# Patient Record
Sex: MALE | Race: WHITE | NOT HISPANIC OR LATINO | URBAN - METROPOLITAN AREA
[De-identification: names, ages, dates, MRNs, and addresses within clinical notes are randomized per-mention and may not be internally consistent; named-entity substitution may affect disease eponyms.]

---

## 2020-11-30 ENCOUNTER — EMERGENCY (EMERGENCY)
Facility: HOSPITAL | Age: 45
LOS: 1 days | Discharge: HOME | End: 2020-11-30
Attending: EMERGENCY MEDICINE | Admitting: EMERGENCY MEDICINE
Payer: MEDICAID

## 2020-11-30 VITALS
RESPIRATION RATE: 17 BRPM | HEART RATE: 59 BPM | OXYGEN SATURATION: 96 % | TEMPERATURE: 99 F | SYSTOLIC BLOOD PRESSURE: 147 MMHG | DIASTOLIC BLOOD PRESSURE: 73 MMHG

## 2020-11-30 VITALS
TEMPERATURE: 98 F | RESPIRATION RATE: 17 BRPM | DIASTOLIC BLOOD PRESSURE: 61 MMHG | HEART RATE: 59 BPM | OXYGEN SATURATION: 96 % | SYSTOLIC BLOOD PRESSURE: 136 MMHG

## 2020-11-30 DIAGNOSIS — R41.82 ALTERED MENTAL STATUS, UNSPECIFIED: ICD-10-CM

## 2020-11-30 DIAGNOSIS — F12.10 CANNABIS ABUSE, UNCOMPLICATED: ICD-10-CM

## 2020-11-30 LAB — GLUCOSE BLDC GLUCOMTR-MCNC: 99 MG/DL — SIGNIFICANT CHANGE UP (ref 70–99)

## 2020-11-30 PROCEDURE — 99284 EMERGENCY DEPT VISIT MOD MDM: CPT

## 2020-11-30 NOTE — ED ADULT NURSE NOTE - CHIEF COMPLAINT QUOTE
patient found outside [facility], admits to taking k2. ambualtes with steady gait.
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
## 2020-11-30 NOTE — ED PROVIDER NOTE - PROGRESS NOTE DETAILS
Now AAO x 3.  Endorses K2 use.  NO acute complaints at this time.  Requesting food prior to discharge.

## 2020-11-30 NOTE — ED PROVIDER NOTE - PATIENT PORTAL LINK FT
You can access the FollowMyHealth Patient Portal offered by [facility] by registering at the following website: [email or web address]. By joining [employer]’s FollowMyHealth portal, you will also be able to view your health information using other applications (apps) compatible with our system.

## 2020-11-30 NOTE — ED ADULT TRIAGE NOTE - CHIEF COMPLAINT QUOTE
patient found outside [facility], admits to taking k2. ambualtes with steady gait.

## 2020-11-30 NOTE — ED PROVIDER NOTE - PHYSICAL EXAMINATION
General: lethargic, arousable to touch  Head: NCAT  Eyes: PERRL  Heart: RRR  Lungs: CTAB  Abd: soft, NTND  Neuro: moves all 4 extremities equally  Skin: no e/o lacerations, abrasions, or ecchymoses General: lethargic, arousable to verbal   Head: NCAT  Eyes: PERRL  Heart: RRR  Lungs: CTAB  Abd: soft, NTND  Neuro: moves all 4 extremities equally  Skin: no e/o lacerations, abrasions, or ecchymoses

## 2020-11-30 NOTE — ED PROVIDER NOTE - CLINICAL SUMMARY MEDICAL DECISION MAKING FREE TEXT BOX
Patient presenting after K2 use/AMS. No trauma or injuries noted. History and ROS limited due to altered state.  No evidence of head or extremity trauma. Will observe for clinical sobriety. Patient presenting after K2 use/AMS. No trauma or injuries noted. Last ED visit 2015 for an unrelated complaint.  History and ROS limited due to altered state.  No evidence of head or extremity trauma. Will observe for clinical sobriety.

## 2020-11-30 NOTE — ED ADULT NURSE REASSESSMENT NOTE - NS ED NURSE REASSESS COMMENT FT1
Pt. tolerating PO without any GI discomfort and ambulating with a steady gait --> d/c
patient explained fall risk during triage. was told plan of care involves walking trial.
Pt. is AAOx2 - self, place. Pt. reports "feeling better than when arrived". Pt. has non labored breathing, speaking in full sentences with no use of accessory muscle, nasal flaring or cough. Pt. denies pain or any discomfort. Pending provider disposition.

## 2021-02-11 NOTE — ED ADULT NURSE NOTE - NS ED NURSE RECORD ANOTHER HT AND WT
Pt arrives via EMS c/o abdominal pain and flank pain starting a couple days ago. Pt seen 2/1/21 and diagnosed with an UTI, reports not starting abx until 3 days ago. Denies v/n. Pt reports decreased appetite.    Yes

## 2021-04-23 ENCOUNTER — EMERGENCY (EMERGENCY)
Facility: HOSPITAL | Age: 46
LOS: 1 days | Discharge: HOME | End: 2021-04-23
Attending: EMERGENCY MEDICINE | Admitting: EMERGENCY MEDICINE
Payer: MEDICAID

## 2021-04-23 VITALS
RESPIRATION RATE: 18 BRPM | HEART RATE: 87 BPM | DIASTOLIC BLOOD PRESSURE: 78 MMHG | SYSTOLIC BLOOD PRESSURE: 130 MMHG | OXYGEN SATURATION: 98 % | TEMPERATURE: 98 F

## 2021-04-23 DIAGNOSIS — Z59.0 HOMELESSNESS: ICD-10-CM

## 2021-04-23 DIAGNOSIS — L98.9 DISORDER OF THE SKIN AND SUBCUTANEOUS TISSUE, UNSPECIFIED: ICD-10-CM

## 2021-04-23 DIAGNOSIS — Z20.7 CONTACT WITH AND (SUSPECTED) EXPOSURE TO PEDICULOSIS, ACARIASIS AND OTHER INFESTATIONS: ICD-10-CM

## 2021-04-23 PROCEDURE — 99283 EMERGENCY DEPT VISIT LOW MDM: CPT

## 2021-04-23 RX ORDER — PERMETHRIN CREAM 5% W/W 50 MG/G
1 CREAM TOPICAL ONCE
Refills: 0 | Status: DISCONTINUED | OUTPATIENT
Start: 2021-04-23 | End: 2021-04-26

## 2021-04-23 SDOH — ECONOMIC STABILITY - HOUSING INSECURITY: HOMELESSNESS: Z59.0

## 2021-04-23 NOTE — ED PROVIDER NOTE - OBJECTIVE STATEMENT
homeless M presents asking for place to clean himself/wash himself after defecating on himself. Also concerned for possible scabies exposure.

## 2021-04-23 NOTE — ED PROVIDER NOTE - PATIENT PORTAL LINK FT
You can access the FollowMyHealth Patient Portal offered by [facility] by registering at the following website: [email or web address]. By joining [employer]’s FollowMyHealth portal, you will also be able to view your health information using other applications (apps) compatible with our system.

## 2021-04-23 NOTE — ED ADULT TRIAGE NOTE - CHIEF COMPLAINT QUOTE
pt. with multiple complaints, states he is homeless and has defecated on himself, requesting a shower. Also states he has bugs crawling on him, his body hurts and he has sores on his body. Pt. insisting on using the bathroom to clean himself during triage. Provided with paper scrubs and wipes and escorted to the bathroom.

## 2021-04-23 NOTE — ED ADULT NURSE NOTE - IS THE PATIENT ABLE TO BE SCREENED?
Patient would like to know what she may take for her right knee swelling. She stated that she is on blood thinners and would like to know prior to taking anything. Stated that it tends get worse during the evening since she has been on her feet. Would like a call back to discuss. No

## 2021-04-23 NOTE — ED PROVIDER NOTE - NSFOLLOWUPINSTRUCTIONS_ED_ALL_ED_FT
Scabies    WHAT YOU NEED TO KNOW:    Scabies is a skin condition that is caused by scabies mites. Scabies mites are tiny bugs that burrow, lay eggs, and live underneath the skin. Scabies is spread through close contact with a person who has scabies. This includes having sex, sleeping in the same bed, or sharing towels or clothing. Scabies can spread quickly and must be treated as soon as it is found.    DISCHARGE INSTRUCTIONS:    Return to the emergency department if:   •You develop a fever and red, swollen, painful areas on your skin.           Contact your healthcare provider if:   •The bites become crusty or filled with pus.       •You have worsening itching after scabies treatment.       •You have new bite or burrow marks after treatment.       •You have questions or concerns about your condition or care.      Medicines:   •Prescription creams are used to treat scabies. You will need to apply them over all of your body from the neck down. Do not swallow this medicine. An oral medication may be ordered if scabies is severe.       •Take your medicine as directed. Contact your healthcare provider if you think your medicine is not helping or if you have side effects. Tell him or her if you are allergic to any medicine. Keep a list of the medicines, vitamins, and herbs you take. Include the amounts, and when and why you take them. Bring the list or the pill bottles to follow-up visits. Carry your medicine list with you in case of an emergency.      Follow up with your healthcare provider as directed: Write down your questions so you remember to ask them during your visits.     Prevent the spread of scabies:   •Have all family members use scabies medicine. Tell all sex partners and anyone who has shared your clothing or bed for the past month about the scabies. Tell them to ask their healthcare provider for scabies medicine even if they have no itching, rash, or burrow marks.      •Wash all items that you have used since 3 days before you learned about your scabies. Use hot water to wash all clothing, bedding, and towels. Dry them for at least 20 minutes on the hot cycle of a dryer. Take items to be dry cleaned that cannot be washed in a washing machine. Place any clothing or bedding that cannot be washed or dry cleaned in a closed plastic bag for 1 week.      •Do not have close body contact with anyone until the scabies mites are gone. Talk to your healthcare provider about how long you need to wait. Also ask about public places to avoid, such as the gym.      Help relieve itching: Your skin may continue to itch for 2 or 3 weeks, even after the scabies mites are gone. Over-the-counter antihistamines or cortisone cream may help relieve itching. Trim your fingernails so you do not spread any mites that are still alive after treatment. Do not scratch your skin. Scratches may cause a skin infection. A cool bath may also help relieve the itching.    Return to school or work: You may return to school or work 24 hours after using scabies medicine.

## 2022-01-11 ENCOUNTER — EMERGENCY (EMERGENCY)
Facility: HOSPITAL | Age: 47
LOS: 1 days | Discharge: HOME | End: 2022-01-11
Admitting: EMERGENCY MEDICINE
Payer: SELF-PAY

## 2022-01-11 VITALS
WEIGHT: 160.06 LBS | RESPIRATION RATE: 20 BRPM | DIASTOLIC BLOOD PRESSURE: 84 MMHG | HEART RATE: 75 BPM | HEIGHT: 71 IN | SYSTOLIC BLOOD PRESSURE: 133 MMHG | OXYGEN SATURATION: 97 % | TEMPERATURE: 99 F

## 2022-01-11 DIAGNOSIS — Z59.00 HOMELESSNESS UNSPECIFIED: ICD-10-CM

## 2022-01-11 DIAGNOSIS — Z00.00 ENCOUNTER FOR GENERAL ADULT MEDICAL EXAMINATION WITHOUT ABNORMAL FINDINGS: ICD-10-CM

## 2022-01-11 PROCEDURE — 99283 EMERGENCY DEPT VISIT LOW MDM: CPT

## 2022-01-11 SDOH — ECONOMIC STABILITY - HOUSING INSECURITY: HOMELESSNESS UNSPECIFIED: Z59.00

## 2022-01-11 NOTE — ED ADULT NURSE NOTE - NSSUHOSCREENINGYN_ED_ALL_ED
received pt sitting on stretcher c/o dizzy spells since thursday accompanied with heaviness to the left arm denies any nausea vomiting , change in vision Yes - the patient is able to be screened

## 2022-01-11 NOTE — ED ADULT TRIAGE NOTE - NS ED TRIAGE AVPU SCALE
Please call patient at [phone] and let him know his STD testing was negative.    Electronically signed by:  Anita Santiago MD   Alert-The patient is alert, awake and responds to voice. The patient is oriented to time, place, and person. The triage nurse is able to obtain subjective information.

## 2022-01-11 NOTE — ED ADULT NURSE NOTE - OBJECTIVE STATEMENT
Pt ambulatory w/ steady gait into ED, EMS states 911 [place]. Pt denies any acute pain or medical complaints.

## 2022-01-12 NOTE — ED PROVIDER NOTE - CLINICAL SUMMARY MEDICAL DECISION MAKING FREE TEXT BOX
medical screening exam has been performed.  Pt with no acute trauma or emergencies noted and exam wnl.  +cold exposure, given food and blankets in the ED, hemodynamically stable and non toxic appearing, medically stable for dc. f/u instructions have been provided
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
## 2022-01-12 NOTE — ED PROVIDER NOTE - PHYSICAL EXAMINATION
Gen - WDWN M, NAD, comfortable and non-toxic appearing  Skin - warm, dry, intact   HEENT - AT/NC, airway patent, neck supple   CV - S1S2, R/R/R  Resp - CTAB, no r/r/w  GI - soft, ND, NT, no CVAT b/l   MS - w/w/p, no c/c/e, FROM, NV intact, +SILT, compartment soft  Neuro - AxOx3, ambulatory without gait disturbance

## 2022-01-12 NOTE — ED PROVIDER NOTE - PATIENT PORTAL LINK FT
You can access the FollowMyHealth Patient Portal offered by [facility] by registering at the following website: [email or web address]. By joining [employer]’s FollowMyHealth portal, you will also be able to view your health information using other applications (apps) compatible with our system.

## 2022-01-31 ENCOUNTER — EMERGENCY (EMERGENCY)
Facility: HOSPITAL | Age: 47
LOS: 1 days | Discharge: HOME | End: 2022-01-31
Attending: EMERGENCY MEDICINE | Admitting: EMERGENCY MEDICINE
Payer: MEDICAID

## 2022-01-31 VITALS
TEMPERATURE: 98 F | HEIGHT: 71 IN | RESPIRATION RATE: 18 BRPM | HEART RATE: 74 BPM | OXYGEN SATURATION: 98 % | SYSTOLIC BLOOD PRESSURE: 131 MMHG | DIASTOLIC BLOOD PRESSURE: 86 MMHG

## 2022-01-31 DIAGNOSIS — Z59.00 HOMELESSNESS UNSPECIFIED: ICD-10-CM

## 2022-01-31 DIAGNOSIS — M79.671 PAIN IN RIGHT FOOT: ICD-10-CM

## 2022-01-31 DIAGNOSIS — F17.200 NICOTINE DEPENDENCE, UNSPECIFIED, UNCOMPLICATED: ICD-10-CM

## 2022-01-31 DIAGNOSIS — B85.1 PEDICULOSIS DUE TO PEDICULUS HUMANUS CORPORIS: ICD-10-CM

## 2022-01-31 PROCEDURE — 99283 EMERGENCY DEPT VISIT LOW MDM: CPT

## 2022-01-31 RX ORDER — PERMETHRIN CREAM 5% W/W 50 MG/G
1 CREAM TOPICAL ONCE
Refills: 0 | Status: COMPLETED | OUTPATIENT
Start: 2022-01-31 | End: 2022-01-31

## 2022-01-31 RX ORDER — IBUPROFEN 200 MG
600 TABLET ORAL ONCE
Refills: 0 | Status: COMPLETED | OUTPATIENT
Start: 2022-01-31 | End: 2022-01-31

## 2022-01-31 RX ADMIN — Medication 600 MILLIGRAM(S): at 19:34

## 2022-01-31 RX ADMIN — PERMETHRIN CREAM 5% W/W 1 APPLICATION(S): 50 CREAM TOPICAL at 19:34

## 2022-01-31 SDOH — ECONOMIC STABILITY - HOUSING INSECURITY: HOMELESSNESS UNSPECIFIED: Z59.00

## 2022-01-31 NOTE — ED PROVIDER NOTE - CLINICAL SUMMARY MEDICAL DECISION MAKING FREE TEXT BOX
treating body lice, d/c home. pt requesting new clothes but there currently none to be had at [facility]. offered paper scrubs and pt refused.

## 2022-01-31 NOTE — ED PROVIDER NOTE - NSFOLLOWUPINSTRUCTIONS_ED_ALL_ED_FT
Body Lice    WHAT YOU NEED TO KNOW:    What are body lice? Body lice are tiny bugs that attach to your skin and live on tiny amounts of blood. Body lice like to bite soft skin areas where clothes fit tight to the body, such as the groin, waist, or armpits. Body lice are light gray and about the size of a sesame seed. Body lice are spread through contact with contaminated clothes or bedding.    What are the signs and symptoms of body lice?   •Severe itching      •Rash or swelling near the hair strands      How are body lice diagnosed and treated? Your healthcare provider will ask you about your signs and symptoms and examine you. Lice medicine is used to kill body lice and is available without a doctor's order. Lice medicine usually comes as a lotion or cream. Use it as directed. Throw away all lice medicine that you do not use. Keep it away from your eyes. Other medicines may also be given to decrease itching and inflammation.    How can I manage or prevent body lice?   •Take a hot shower and wash all clothes, towels, and bedding in hot, soapy water. Dry them on the hot cycle for at least 20 minutes. Items that cannot be washed or dry cleaned should be sealed in an airtight plastic bag for 2 weeks. Do not share towels and sheets with others. Vacuum furniture, rugs, carpets, car seats, or other fabrics.      •Do not have close body contact with anyone until all your lice are gone.       When should I call my doctor?   •You have a fever.      •Your body lice do not go away, even after treatment.       •The lice bites become crusty or filled with pus, or your skin has a bad smell.       •Your skin burns, stings, swells, or is numb after you use lice medicine.      •You have questions or concerns about your condition or care.      CARE AGREEMENT:    You have the right to help plan your care. Learn about your health condition and how it may be treated. Discuss treatment options with your healthcare providers to decide what care you want to receive. You always have the right to refuse treatment.

## 2022-01-31 NOTE — ED ADULT TRIAGE NOTE - CHIEF COMPLAINT QUOTE
pt. c/o right foot toe pain and states "I have bugs, I have bites everywhere". No visible bugs noted in triage.

## 2022-01-31 NOTE — ED PROVIDER NOTE - PATIENT PORTAL LINK FT
You can access the FollowMyHealth Patient Portal offered by [facility] by registering at the following website: [email or web address]. By joining [employer]’s FollowMyHealth portal, you will also be able to view your health information using other applications (apps) compatible with our system.

## 2022-01-31 NOTE — ED PROVIDER NOTE - OBJECTIVE STATEMENT
47 y/o M homeless reporting exposure to lice, requesting treatment. No rash. Also has chronic R toe pain where his 2nd toe rubs against his 1st toe.

## 2022-02-01 PROBLEM — Z78.9 OTHER SPECIFIED HEALTH STATUS: Chronic | Status: ACTIVE | Noted: 2022-01-12

## 2024-01-29 ENCOUNTER — EMERGENCY (EMERGENCY)
Facility: HOSPITAL | Age: 49
LOS: 1 days | Discharge: HOME | End: 2024-01-29
Admitting: EMERGENCY MEDICINE
Payer: MEDICAID

## 2024-01-29 VITALS
WEIGHT: 149.91 LBS | HEART RATE: 73 BPM | DIASTOLIC BLOOD PRESSURE: 100 MMHG | OXYGEN SATURATION: 99 % | TEMPERATURE: 98 F | SYSTOLIC BLOOD PRESSURE: 180 MMHG | RESPIRATION RATE: 18 BRPM
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
## 2024-01-29 DIAGNOSIS — S93.401A SPRAIN OF UNSPECIFIED LIGAMENT OF RIGHT ANKLE, INITIAL ENCOUNTER: ICD-10-CM
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
## 2024-01-29 DIAGNOSIS — M25.571 PAIN IN RIGHT ANKLE AND JOINTS OF RIGHT FOOT: ICD-10-CM

## 2024-01-29 PROCEDURE — 73610 X-RAY EXAM OF ANKLE: CPT | Mod: 26,RT

## 2024-01-29 PROCEDURE — 99284 EMERGENCY DEPT VISIT MOD MDM: CPT

## 2024-01-29 RX ORDER — IBUPROFEN 200 MG
600 TABLET ORAL ONCE
Refills: 0 | Status: COMPLETED | OUTPATIENT
Start: 2024-01-29 | End: 2024-01-29

## 2024-01-29 RX ADMIN — Medication 600 MILLIGRAM(S): at 11:29

## 2024-01-29 NOTE — ED PROVIDER NOTE - PATIENT PORTAL LINK FT
You can access the FollowMyHealth Patient Portal offered by [facility] by registering at the following website: [email or web address]. By joining [employer]’s FollowMyHealth portal, you will also be able to view your health information using other applications (apps) compatible with our system.

## 2024-01-29 NOTE — ED PROVIDER NOTE - PROGRESS NOTE DETAILS
X-ray read by radiologist, unremarkable  Patient stable for discharge  Patient ankle placed in an Ace wrap, and offered crutches versus a cane.  Patient requested the cane instead of the crutches.  Patient stable for discharge with outpatient follow-up  All results with patient.  Patient understands agrees to plan.  Agreed to follow primary care doctor in 2 to 3 days.

## 2024-01-29 NOTE — ED ADULT NURSE NOTE - OBJECTIVE STATEMENT
Rt ankle pain. Pt states he ran down stairs and "landed wrong". No deformity. Neurovascular status intact.

## 2024-01-29 NOTE — ED PROVIDER NOTE - PHYSICAL EXAMINATION
General: well developed, well nourished, no distress  Eyes: no scleral injection  Neck: non-tender, full range of motion, supple.   Respiratory: unlabored breathing  Cardiovascular: no central cyanosis  Musculoskeletal: normal gait.   Extremities: normal range of motion, non-tender.  JENNY at b/l knees, ankles, feet. Neurovascularly intact distal to extremity. Cap refill distal to affected joint < 2 sec.  No gross deformity, swelling, erythema, open wounds noted  Neurologic: alert, oriented to person, oriented to place, oriented to time.    Skin: normal color.  Negative For: rash  Psychiatric: normal affect, normal insight, normal concentration

## 2024-01-29 NOTE — ED PROVIDER NOTE - OBJECTIVE STATEMENT
49 yo M, no pmh, presents to this ED for R ankle pain after fall down 3 steps yesterday. Woke up this morning with pain over the lateral mallelus, could not ambulate, so he called 911 to transport him to the ED. STates that they fell/hit on affected joint. Has been in pain since. Pain has gotten worse with time. Has not tried medications, ice, wrapping joint. Denies trauma to head. Denies pain in joint above or below. Denies SOB, CP, calf tenderness/swelling, hemoptysis, recent surgeries, hx of CA, long plane/train/car rides, past clots in legs/lungs.

## 2024-01-29 NOTE — ED ADULT NURSE NOTE - NSFALLRISKINTERV_ED_ALL_ED
Assistance OOB with selected safe patient handling equipment if applicable/Assistance with ambulation/Communicate fall risk and risk factors to all staff, patient, and family/Monitor gait and stability/Provide visual cue: yellow wristband, yellow gown, etc/Reinforce activity limits and safety measures with patient and family/Call bell, personal items and telephone in reach/Instruct patient to call for assistance before getting out of bed/chair/stretcher/Non-slip footwear applied when patient is off stretcher/[place] to call system/Physically safe environment - no spills, clutter or unnecessary equipment/Purposeful Proactive Rounding/Room/bathroom lighting operational, light cord in reach

## 2024-01-29 NOTE — ED PROVIDER NOTE - NSFOLLOWUPINSTRUCTIONS_ED_ALL_ED_FT
Overview  An ankle sprain can happen when you twist your ankle. The ligaments that support the ankle can get stretched and torn. Often the ankle is swollen and painful.    Ankle sprains may take from several weeks to several months to heal. Usually, the more pain and swelling you have, the more severe your ankle sprain is and the longer it will take to heal. You can heal faster and regain strength in your ankle with good home treatment.    It is very important to give your ankle time to heal completely, so that you do not easily hurt your ankle again.    Follow-up care is a key part of your treatment and safety. Be sure to make and go to all appointments, and call your doctor or nurse advice line ([phone] in most provinces and territories) if you are having problems. It's also a good idea to know your test results and keep a list of the medicines you take.    How can you care for yourself at home?  Prop up your foot on pillows as much as possible for the next 3 days. Try to keep your ankle above the level of your heart. This will help reduce the swelling.  Follow your doctor's directions for wearing a splint or elastic bandage. Wrapping the ankle may help reduce or prevent swelling.  Your doctor may give you a splint, a brace, an air stirrup, or another form of ankle support to protect your ankle until it is healed. Wear it as directed while your ankle is healing. Do not remove it unless your doctor tells you to. After your ankle has healed, ask your doctor whether you should wear the brace when you exercise.  Put ice or cold packs on your injured ankle for 10 to 20 minutes at a time. Try to do this every 1 to 2 hours for the next 3 days (when you are awake) or until the swelling goes down. Put a thin cloth between the ice and your skin.  You may need to use crutches until you can walk without pain. If you do use crutches, try to bear some weight on your injured ankle if you can do so without pain. This helps the ankle heal.  Take pain medicines exactly as directed.  If the doctor gave you a prescription medicine for pain, take it as prescribed.  If you are not taking a prescription pain medicine, ask your doctor if you can take an over-the-counter medicine.  If you have been given ankle exercises to do at home, do them exactly as instructed. These can promote healing and help prevent lasting weakness.  When should you call for help?  	  Call your doctor or nurse advice line now or seek immediate medical care if:    Your pain is getting worse.  Your swelling is getting worse.  Your splint feels too tight or you are unable to loosen it.  Watch closely for changes in your health, and be sure to contact your doctor or nurse advice line if:    You are not getting better after 1 week.

## 2024-01-29 NOTE — ED PROVIDER NOTE - CLINICAL SUMMARY MEDICAL DECISION MAKING FREE TEXT BOX
Pt presents for ankle pain after a fall last night  No swelling, erythema, warmth to joint. ROM intact, VSS - septic arthritis unlikely.  Pt does not meet Wells criteria - DVT unlikely  Does not meet Karavel’s signs for Flexor Tenosynovitis  No paresthesia, pallor, paralysis, pulselenness - compartment syndrome unlikely  Xray ordered to rule out/in bony deformities.  Pain control medications ordered

## 2024-01-29 NOTE — ED PROVIDER NOTE - CARE PROVIDER_API CALL
Nico Parkinson  Orthopaedic Surgery  [address]  [place] [address]  Phone: [phone]  Fax: [phone]  Follow Up Time:

## 2024-01-29 NOTE — ED PROVIDER NOTE - CCCP TRG CHIEF CMPLNT
Bed: Exam 07  Expected date:   Expected time:   Means of arrival:   Comments:  UNRULY HTN   ankle injury

## 2024-06-05 ENCOUNTER — EMERGENCY (EMERGENCY)
Facility: HOSPITAL | Age: 49
LOS: 0 days | Discharge: HOME | End: 2024-06-05
Attending: EMERGENCY MEDICINE
Payer: SELF-PAY

## 2024-06-05 VITALS
HEART RATE: 89 BPM | OXYGEN SATURATION: 98 % | DIASTOLIC BLOOD PRESSURE: 84 MMHG | WEIGHT: 184.97 LBS | TEMPERATURE: 99 F | RESPIRATION RATE: 18 BRPM | HEIGHT: 67 IN | SYSTOLIC BLOOD PRESSURE: 134 MMHG

## 2024-06-05 DIAGNOSIS — I10 ESSENTIAL (PRIMARY) HYPERTENSION: ICD-10-CM

## 2024-06-05 DIAGNOSIS — K59.00 CONSTIPATION, UNSPECIFIED: ICD-10-CM

## 2024-06-05 DIAGNOSIS — F17.200 NICOTINE DEPENDENCE, UNSPECIFIED, UNCOMPLICATED: ICD-10-CM

## 2024-06-05 DIAGNOSIS — Z90.81 ACQUIRED ABSENCE OF SPLEEN: ICD-10-CM

## 2024-06-05 DIAGNOSIS — R10.10 UPPER ABDOMINAL PAIN, UNSPECIFIED: ICD-10-CM

## 2024-06-05 DIAGNOSIS — R10.12 LEFT UPPER QUADRANT PAIN: ICD-10-CM

## 2024-06-05 DIAGNOSIS — R10.13 EPIGASTRIC PAIN: ICD-10-CM

## 2024-06-05 DIAGNOSIS — R11.0 NAUSEA: ICD-10-CM

## 2024-06-05 LAB
ALBUMIN SERPL ELPH-MCNC: 4.6 G/DL — SIGNIFICANT CHANGE UP (ref 3.5–5.2)
ALP SERPL-CCNC: 63 U/L — SIGNIFICANT CHANGE UP (ref 30–115)
ALT FLD-CCNC: 19 U/L — SIGNIFICANT CHANGE UP (ref 0–41)
ANION GAP SERPL CALC-SCNC: 11 MMOL/L — SIGNIFICANT CHANGE UP (ref 7–14)
APPEARANCE UR: CLEAR — SIGNIFICANT CHANGE UP
AST SERPL-CCNC: 18 U/L — SIGNIFICANT CHANGE UP (ref 0–41)
BASOPHILS # BLD AUTO: 0.1 K/UL — SIGNIFICANT CHANGE UP (ref 0–0.2)
BASOPHILS NFR BLD AUTO: 0.9 % — SIGNIFICANT CHANGE UP (ref 0–1)
BILIRUB DIRECT SERPL-MCNC: <0.2 MG/DL — SIGNIFICANT CHANGE UP (ref 0–0.3)
BILIRUB INDIRECT FLD-MCNC: SIGNIFICANT CHANGE UP MG/DL (ref 0.2–1.2)
BILIRUB SERPL-MCNC: <0.2 MG/DL — SIGNIFICANT CHANGE UP (ref 0.2–1.2)
BILIRUB UR-MCNC: NEGATIVE — SIGNIFICANT CHANGE UP
BUN SERPL-MCNC: 16 MG/DL — SIGNIFICANT CHANGE UP (ref 10–20)
CALCIUM SERPL-MCNC: 9.6 MG/DL — SIGNIFICANT CHANGE UP (ref 8.4–10.4)
CHLORIDE SERPL-SCNC: 104 MMOL/L — SIGNIFICANT CHANGE UP (ref 98–110)
CO2 SERPL-SCNC: 26 MMOL/L — SIGNIFICANT CHANGE UP (ref 17–32)
COLOR SPEC: YELLOW — SIGNIFICANT CHANGE UP
CREAT SERPL-MCNC: 0.8 MG/DL — SIGNIFICANT CHANGE UP (ref 0.7–1.5)
DIFF PNL FLD: NEGATIVE — SIGNIFICANT CHANGE UP
EGFR: 109 ML/MIN/1.73M2 — SIGNIFICANT CHANGE UP
EOSINOPHIL # BLD AUTO: 0.58 K/UL — SIGNIFICANT CHANGE UP (ref 0–0.7)
EOSINOPHIL NFR BLD AUTO: 5.2 % — SIGNIFICANT CHANGE UP (ref 0–8)
GLUCOSE SERPL-MCNC: 119 MG/DL — HIGH (ref 70–99)
GLUCOSE UR QL: NEGATIVE MG/DL — SIGNIFICANT CHANGE UP
HCT VFR BLD CALC: 38.4 % — LOW (ref 42–52)
HGB BLD-MCNC: 12.9 G/DL — LOW (ref 14–18)
IMM GRANULOCYTES NFR BLD AUTO: 0.4 % — HIGH (ref 0.1–0.3)
KETONES UR-MCNC: NEGATIVE MG/DL — SIGNIFICANT CHANGE UP
LACTATE SERPL-SCNC: 1 MMOL/L — SIGNIFICANT CHANGE UP (ref 0.7–2)
LEUKOCYTE ESTERASE UR-ACNC: NEGATIVE — SIGNIFICANT CHANGE UP
LIDOCAIN IGE QN: 37 U/L — SIGNIFICANT CHANGE UP (ref 7–60)
LYMPHOCYTES # BLD AUTO: 3.91 K/UL — HIGH (ref 1.2–3.4)
LYMPHOCYTES # BLD AUTO: 35.2 % — SIGNIFICANT CHANGE UP (ref 20.5–51.1)
MCHC RBC-ENTMCNC: 33 PG — HIGH (ref 27–31)
MCHC RBC-ENTMCNC: 33.6 G/DL — SIGNIFICANT CHANGE UP (ref 32–37)
MCV RBC AUTO: 98.2 FL — HIGH (ref 80–94)
MONOCYTES # BLD AUTO: 0.91 K/UL — HIGH (ref 0.1–0.6)
MONOCYTES NFR BLD AUTO: 8.2 % — SIGNIFICANT CHANGE UP (ref 1.7–9.3)
NEUTROPHILS # BLD AUTO: 5.56 K/UL — SIGNIFICANT CHANGE UP (ref 1.4–6.5)
NEUTROPHILS NFR BLD AUTO: 50.1 % — SIGNIFICANT CHANGE UP (ref 42.2–75.2)
NITRITE UR-MCNC: NEGATIVE — SIGNIFICANT CHANGE UP
NRBC # BLD: 0 /100 WBCS — SIGNIFICANT CHANGE UP (ref 0–0)
PH UR: 7 — SIGNIFICANT CHANGE UP (ref 5–8)
PLATELET # BLD AUTO: 398 K/UL — SIGNIFICANT CHANGE UP (ref 130–400)
PMV BLD: 10.1 FL — SIGNIFICANT CHANGE UP (ref 7.4–10.4)
POTASSIUM SERPL-MCNC: 4.9 MMOL/L — SIGNIFICANT CHANGE UP (ref 3.5–5)
POTASSIUM SERPL-SCNC: 4.9 MMOL/L — SIGNIFICANT CHANGE UP (ref 3.5–5)
PROT SERPL-MCNC: 7.2 G/DL — SIGNIFICANT CHANGE UP (ref 6–8)
PROT UR-MCNC: NEGATIVE MG/DL — SIGNIFICANT CHANGE UP
RBC # BLD: 3.91 M/UL — LOW (ref 4.7–6.1)
RBC # FLD: 15.3 % — HIGH (ref 11.5–14.5)
SODIUM SERPL-SCNC: 141 MMOL/L — SIGNIFICANT CHANGE UP (ref 135–146)
SP GR SPEC: 1.01 — SIGNIFICANT CHANGE UP (ref 1–1.03)
UROBILINOGEN FLD QL: 0.2 MG/DL — SIGNIFICANT CHANGE UP (ref 0.2–1)
WBC # BLD: 11.1 K/UL — HIGH (ref 4.8–10.8)
WBC # FLD AUTO: 11.1 K/UL — HIGH (ref 4.8–10.8)

## 2024-06-05 PROCEDURE — 36415 COLL VENOUS BLD VENIPUNCTURE: CPT

## 2024-06-05 PROCEDURE — 85025 COMPLETE CBC W/AUTO DIFF WBC: CPT

## 2024-06-05 PROCEDURE — 80048 BASIC METABOLIC PNL TOTAL CA: CPT

## 2024-06-05 PROCEDURE — 80076 HEPATIC FUNCTION PANEL: CPT

## 2024-06-05 PROCEDURE — 96375 TX/PRO/DX INJ NEW DRUG ADDON: CPT

## 2024-06-05 PROCEDURE — 74177 CT ABD & PELVIS W/CONTRAST: CPT | Mod: MC

## 2024-06-05 PROCEDURE — 96374 THER/PROPH/DIAG INJ IV PUSH: CPT | Mod: XU

## 2024-06-05 PROCEDURE — 99285 EMERGENCY DEPT VISIT HI MDM: CPT

## 2024-06-05 PROCEDURE — 83690 ASSAY OF LIPASE: CPT

## 2024-06-05 PROCEDURE — 81003 URINALYSIS AUTO W/O SCOPE: CPT

## 2024-06-05 PROCEDURE — 83605 ASSAY OF LACTIC ACID: CPT

## 2024-06-05 PROCEDURE — 99284 EMERGENCY DEPT VISIT MOD MDM: CPT | Mod: 25

## 2024-06-05 PROCEDURE — 74177 CT ABD & PELVIS W/CONTRAST: CPT | Mod: 26,MC

## 2024-06-05 RX ORDER — SODIUM CHLORIDE 9 MG/ML
1000 INJECTION INTRAMUSCULAR; INTRAVENOUS; SUBCUTANEOUS ONCE
Refills: 0 | Status: COMPLETED | OUTPATIENT
Start: 2024-06-05 | End: 2024-06-05

## 2024-06-05 RX ORDER — ONDANSETRON 8 MG/1
4 TABLET, FILM COATED ORAL ONCE
Refills: 0 | Status: COMPLETED | OUTPATIENT
Start: 2024-06-05 | End: 2024-06-05

## 2024-06-05 RX ORDER — FAMOTIDINE 10 MG/ML
20 INJECTION INTRAVENOUS ONCE
Refills: 0 | Status: COMPLETED | OUTPATIENT
Start: 2024-06-05 | End: 2024-06-05

## 2024-06-05 RX ORDER — POLYETHYLENE GLYCOL 3350 17 G/17G
17 POWDER, FOR SOLUTION ORAL
Qty: 1 | Refills: 0
Start: 2024-06-05 | End: 2024-06-11

## 2024-06-05 RX ADMIN — FAMOTIDINE 20 MILLIGRAM(S): 10 INJECTION INTRAVENOUS at 15:37

## 2024-06-05 RX ADMIN — ONDANSETRON 4 MILLIGRAM(S): 8 TABLET, FILM COATED ORAL at 15:37

## 2024-06-05 RX ADMIN — SODIUM CHLORIDE 1000 MILLILITER(S): 9 INJECTION INTRAMUSCULAR; INTRAVENOUS; SUBCUTANEOUS at 15:38
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
## 2024-06-05 NOTE — ED ADULT TRIAGE NOTE - CHIEF COMPLAINT QUOTE
PT presents to the ED c/o of abd pain going on for x3 months. Pt states pain is on and off. Pt endorses some nausea but no vomiting.

## 2024-06-05 NOTE — ED PROVIDER NOTE - PATIENT PORTAL LINK FT
You can access the FollowMyHealth Patient Portal offered by [facility] by registering at the following website: [email or web address]. By joining [employer]’s FollowMyHealth portal, you will also be able to view your health information using other applications (apps) compatible with our system.

## 2024-06-05 NOTE — ED PROVIDER NOTE - NSFOLLOWUPINSTRUCTIONS_ED_ALL_ED_FT
Our Emergency Department Referral Coordinators will be reaching out to you in the next 24-48 hours from 9:00am to 5:00pm with a follow up appointment. Please expect a phone call from the hospital in that time frame. If you do not receive a call or if you have any questions or concerns, you can reach them at   [phone]    Abdominal Pain    Many things can cause abdominal pain. Many times, abdominal pain is not caused by a disease and will improve without treatment. Your health care provider will do a physical exam to determine if there is a dangerous cause of your pain; blood tests and imaging may help determine the cause of your pain. However, in many cases, no cause may be found and you may need further testing as an outpatient. Monitor your abdominal pain for any changes.     SEEK IMMEDIATE MEDICAL CARE IF YOU HAVE ANY OF THE FOLLOWING SYMPTOMS: worsening abdominal pain, uncontrollable vomiting, profuse diarrhea, inability to have bowel movements or pass gas, black or bloody stools, fever accompanying chest pain or back pain, or fainting. These symptoms may represent a serious problem that is an emergency. Do not wait to see if the symptoms will go away. Get medical help right away. Call 911 and do not drive yourself to the hospital.    Constipation    Constipation is when a person has fewer than three bowel movements a week, has difficulty having a bowel movement, or has stools that are dry, hard, or larger than normal. Other symptoms can include abdominal pain or bloating. As people grow older, constipation is more common. A low-fiber diet, not taking in enough fluids, and taking certain medicines may make constipation worse. Treatment varies but may include dietary modifications (more fiber-rich foods), lifestyle modifications, and possible medications.     SEEK IMMEDIATE MEDICAL CARE IF YOU HAVE THE FOLLOWING SYMPTOMS: bright red blood in your stool, constipation for longer than 4 days, abdominal or rectal pain, unexplained weight loss, or inability to pass gas.

## 2024-06-05 NOTE — ED PROVIDER NOTE - NSDCPRINTRESULTS_ED_ALL_ED
Deonte Ross
Patient requests all Lab, Cardiology, and Radiology Results on their Discharge Instructions

## 2024-06-05 NOTE — ED ADULT NURSE NOTE - NSFALLUNIVINTERV_ED_ALL_ED
Bed/Stretcher in lowest position, wheels locked, appropriate side rails in place/Call bell, personal items and telephone in reach/Instruct patient to call for assistance before getting out of bed/chair/stretcher/Non-slip footwear applied when patient is off stretcher/[place] to call system/Physically safe environment - no spills, clutter or unnecessary equipment/Purposeful proactive rounding/Room/bathroom lighting operational, light cord in reach

## 2024-06-05 NOTE — ED PROVIDER NOTE - PHYSICAL EXAMINATION
Vital Signs: I have reviewed the initial vital signs.  Constitutional: NAD, well-nourished, appears stated age, no acute distress.  HEENT: Airway patent, moist MM, no erythema/swelling/deformity of oral structures. EOMI, PERRLA.  CV: regular rate, regular rhythm, well-perfused extremities, 2+ b/l DP and radial pulses equal.  Lungs: BCTA, no increased WOB.  ABD: +tenderness epigastric/LUQ, ND, no guarding or rebound, no pulsatile mass, no hernias.   MSK: Neck supple, nontender, nl ROM, no stepoff. Chest nontender. Back nontender. Ext nontender, nl rom, no deformity.   INTEG: Skin warm, dry, no rash.  NEURO: A&Ox3, normal strength, nl sensation throughout, normal speech.   PSYCH: Calm, cooperative, normal affect and interaction.

## 2024-06-05 NOTE — ED PROVIDER NOTE - CLINICAL SUMMARY MEDICAL DECISION MAKING FREE TEXT BOX
Patient presented with 2 months of upper abdominal pain associated with nausea. (+) tender on exam but otherwise afebrile, HD stable, well appearing. Obtained labs which were grossly unremarkable including no significant leukocytosis, anemia, signs of dehydration/JAMES, transaminitis or significant electrolyte abnormalities. UA negative for infection. CT abd/pelvis negative for emergent processes. Patient felt much better after tx in ED, and serial abdominal exams benign. Able to tolerate PO. Given the above, will discharge home with outpatient follow up. Patient agreeable with plan. Agrees to return to ED for any new or worsening symptoms.

## 2024-06-27 NOTE — ED ADULT TRIAGE NOTE - HEIGHT IN INCHES
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 He is status post 5 day course of IV Solumedrol (-) and received another dose of solumedrol 1 gram 24. He completed Tocilizumab infusion on 2024.    Interval Hx: No events overnight, no complaints this morning. He is getting solumedrol infusion this morning and tolerating well.     Current CNS/other medications:  Oxcarbazepine 600 mg BID. Trough level 30.  Briviact 100 mg TID. Trough level 3.3  Epidiolex 5 ml BID  Lyrica 75 mg TID  Clonazepam 4 mg TID  Cellcept 1 g BID  Quetiapine 150 mg QPM  Sertraline 150 mg QAM  Melatonin 10 mg QHS  Vitamin D 2000 IU daily  Tamsulosin 0.4 mg PRN  ASA 81 mg daily  PRN intranasal Midazolam 5 mg as rescue for breakthrough seizures longer than 3 minutes  Status post IV Solumedrol (5 day course ending 2024; single dose today 2024)  Status post Tocilizumab (2024)    Initial HPI: History is obtained from mother and chart.   Symptoms started age 11 with severe headaches, then developed seizures. They were tonic seizures lasting a few minutes with LOC x 10-15 seconds and generalized body weakness and loss of tone. Episodes were sporadic and occurred every few months. Cognitive and motor impairments started the next year, he developed progressive myoclonic jerks. By teenage years he had episodes of delusions, hallucinations, cognitive and motor slowing and neuropsychiatric features.   He had extensive treatments and testing throughout his course. Immunotherapy included Cellcept, Rituxan, PLEX and IVIG. These did show some improvement in symptoms but was still variable.   On  their neurologist in [facility] started him on Vyvgart weekly infusions, since initiation of the Vyvgart mom has noted significant improvement in his stiffness and decrease in seizure frequency. He was having several tonic seizures a week but had not had any from starting the Vyvgart until last Saturday when he travelled here to [place]. Mom believes stress from the plane and moving the patient led to build up of jerks and then he had a seizure. Mom also endorses his speech is also improved.      Past medical history:    As above  Allergies:  NKDA   Current Home Medications:     Cellcept 1g BID   Trileptal 600mg BID   Briviact 100mg TID   Lyrica 75mg TID   Clonazepam 4mg TID   Sertraline 150mg QAM  Quetiapine 150mg BID  Melatonin 10mg QHS   Epidiolex 5 ml BID   Tamsulosin 0.4 mg PRN, usually gives in the afternoon  Vyvgart 1600mg (20mg/kg, 83kg) weekly last 6/3/2024, will pause while visiting [place] for at least 4 weeks      ASM Trials:   Keppra(behavior & mood changes), Valium    Neuroinvestigations:  MRI Brain and entire spine with and without contrast 24 [facility]: normal, no abnormal enhancement  MR Chest 24 [facility]: No thymoma, normal  CSF studies 24 [facility]: Glucose 63, Protein 36, anti Gly R Ab negative, Paraneoplastic panel neg  UA no signs of infection  TFTs slightly abnormal with low T3 and T4  Hepatitis screen negative, FRF negative, TPO negative, Celiac panel negative, dsDNA negative,   Vitamin D 36     MRI brain from 2020, no report provided. Upon imaging review there is questionable FLAIR signal hypoerintensity involving cortical ribbon of the left temporal lobe.  MRI brain W/O 2022 at [facility]: "No acute intracranial abnormality. No abnormality of the spinal cord".  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  REEG 2020: Abnormal EEG with sharp wave discharge predominantly on the right.  REEG 2022: This is abnormal EEG of the brain due to the presence of bilateral multifocal epileptiform discharges were seen abundantly throughout the recording indicative of refractory multifocal epilepsy disorder.  REEG 2024: This is an abnormal EEG of the brain due to the presence of generalized epileptiform discharges seen throughout the recording indicative of generalized myoclonic epilepsy disorder.  Normal serum testing from 2024: CBC, CMP, TSH, CRP  Abnormal serum testing from 2024: Na: 132, Ig.40, IgM: <0.25, Creatnine: 48.  CSF 2022: Showed an opening pressure of 15cm. No testing was provided, per patients mom she was told everything was normal to include anti-glycine receptor.  Birth history:  Born full term, uncomplicated.   Developmental history:   No concerns early on.  Family History:    No family history of epilepsy.   Social history: Lives with parents, has one older sibling and 2 younger siblings.   ROS: Pertinent as per HPI.     Physical Exam:  General: Well nourished, no dysmorphic features. Sitting in bed  Mental status: Alert, attentive to examiner. Can follow simple commands in English. speech more dysarthric than day prior  Cranial Nerves: EOM intact in all directions. No nystagmus, facial sensation appears intact, facial activation full and symmetric, tongue midline, hearing intact to conversation  Motor: normal bulk, moves all extremities antigravity and provides some force (at least 4/5) but increase in myoclonus with action   Sensation: deferred  Reflexes: DTRs are 2+ and symmetric patellar and ankles.   Gait/Coordination: Occasional myoclonus all 4 extremities, face and torso increased with movement but continues to improve since admission.    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 His video EEG did not capture any seizures and no changes were made to his ASM regimen. He is status post 5 day course of IV Solumedrol (-) and he is now receving solumedrol 1 gram twice a week since 25 for 3 weeks. He completed Tocilizumab infusion on 2024. Continues to do well with some fluctuations in symptoms, no complaints today.     Plan:   1) Follow up remaining pending labs  2) Seizure/fall precautions   3) Continue home medication regimen  Oxcarbazepine 600 mg BID. Trough level 30.  Briviact 100 mg TID. Trough level 3.3  Epidiolex 5 ml BID  Lyrica 75 mg TID  Clonazepam 4 mg TID  Cellcept 1 g BID  Quetiapine 150 mg QPM (currently decreased from BID dosing)  Sertraline 150 mg QAM  Melatonin 10 mg QHS  PRN intranasal Midazolam 5 mg as rescue for breakthrough seizures longer than 3 minutes  Tamsulosin 0.4 mg PRN, usually gives in the afternoon    4)  PRN intranasal Midazolam 5 mg for seizure over 3 minutes. May repeat an additional 5 mg dose 3 minutes after the first dose if seizure still active.    5) Getting IV Solumedrol 1 g today 27 with protonix prior to infusion, will give twice weekly (monday and thursday) for next few weeks.    6) PT and OT following  7) Plan for Rehab, possibly next week, will follow up with social work, was accepted to [facility]  8) Continue vitamin D supplementation with goal of supratherapeutic level  9) Continue ASA 81 mg QD due to some markers for possible antiphospholipid ab syndrome     The above findings and plan were discussed with the housestaff, epilepsy NP and primary epileptologist(Dr. Najjar).      11